# Patient Record
Sex: MALE | Employment: FULL TIME | URBAN - METROPOLITAN AREA
[De-identification: names, ages, dates, MRNs, and addresses within clinical notes are randomized per-mention and may not be internally consistent; named-entity substitution may affect disease eponyms.]

---

## 2021-11-29 PROCEDURE — 99283 EMERGENCY DEPT VISIT LOW MDM: CPT

## 2021-11-29 PROCEDURE — 12002 RPR S/N/AX/GEN/TRNK2.6-7.5CM: CPT

## 2021-11-30 ENCOUNTER — HOSPITAL ENCOUNTER (EMERGENCY)
Facility: CLINIC | Age: 56
Discharge: HOME | End: 2021-11-30
Attending: EMERGENCY MEDICINE | Admitting: EMERGENCY MEDICINE
Payer: COMMERCIAL

## 2021-11-30 ENCOUNTER — PATIENT OUTREACH (OUTPATIENT)
Dept: FAMILY MEDICINE | Facility: CLINIC | Age: 56
End: 2021-11-30
Payer: COMMERCIAL

## 2021-11-30 VITALS
OXYGEN SATURATION: 95 % | DIASTOLIC BLOOD PRESSURE: 85 MMHG | TEMPERATURE: 97.9 F | RESPIRATION RATE: 18 BRPM | SYSTOLIC BLOOD PRESSURE: 133 MMHG | HEART RATE: 104 BPM

## 2021-11-30 DIAGNOSIS — S61.412A LACERATION WITHOUT FOREIGN BODY OF LEFT HAND, INITIAL ENCOUNTER: ICD-10-CM

## 2021-11-30 ASSESSMENT — ENCOUNTER SYMPTOMS: WOUND: 1

## 2021-11-30 NOTE — ED TRIAGE NOTES
Pt was using a  and injured his left hand. Bleeding controled. Thinks he had a tetnus shot 2 years ago. Pt took 800mg of ibuprofen 45 min ago

## 2021-11-30 NOTE — TELEPHONE ENCOUNTER
Follow up with Chris Vera MD in 1 week (around  12/7/2021)    Why: For suture removal  Specialty: Internal Medicine  Contact: [address] [place] [address]  [place] [address]  [phone]

## 2021-11-30 NOTE — ED PROVIDER NOTES
History   Chief Complaint:  Laceration      The history is provided by the patient.      Ananda Diaz is a 56 year old male who presents for evaluation a left hand laceration. This wound occurred at home while he was working with a  approximately 45 minutes ago. He is not taking any blood thinning medication. His last tetanus shot was 2 years ago.    Review of Systems   Skin: Positive for wound.   All other systems reviewed and are negative.        Allergies:  No known allergies    Medications:  The patient is currently on no regular medications.    Past Medical History:     Right inguinal hernia  Chronic hip pain  Chronic right shoulder pain    Past Surgical History:    Nose surgery    Family History:    Breast cancer  Type 2 diabetes    Social History:  Presents with his wife  PCP: Chris Vera     Physical Exam     Patient Vitals for the past 24 hrs:   BP Temp Temp src Pulse Resp SpO2   11/30/21 0220 133/85 -- -- -- 18 95 %   11/29/21 2138 (!) 179/82 97.9  F (36.6  C) Oral 104 20 96 %       Physical Exam  General: Patient is alert and normal appearing.  HEENT: Head atraumatic   Chest: Heart regular rate and rhythm.   Lungs: Equal clear to auscultation with no wheeze or rales  Abdomen: Soft, non tender, nondistended,   Back: No costovertebral angle tenderness  Neuro: Grossly nonfocal, normal speech, strength equal bilaterally  Extremities: No deformities, equal radial and DP pulses. No clubbing, cyanosis.  No edema  Skin: Warm and dry with no rash.  5 cm laceration to the thenar eminence of his left hand.  Left thumb with cap refill less than 2 seconds.  Extensor and flexor tendon strength intact.  No foreign body identified.  Bleeding controlled.  Light touch sensation intact to the radial, median, ulnar nerve distribution      Emergency Department Course     Procedures    Laceration Repair        LACERATION:  A simple clean 5 cm laceration.      LOCATION:  Pad of left thumb      FUNCTION:   Distally sensation, circulation, motor and tendon function are intact.      ANESTHESIA:  Local using  1% Lidocaine w/ Epi total of 3 cc's      PREPARATION:  Irrigation with Normal Saline      DEBRIDEMENT:  no debridement      CLOSURE:  Wound was closed with One Layer.  Skin closed with 6 x 5.0 Ethylon using interrupted sutures.      Emergency Department Course:  Reviewed:  I reviewed nursing notes, vitals, past medical history and Care Everywhere    Assessments:  0142 I obtained history and examined the patient as noted above.   0155    I performed a laceration procedure, see procedure note for details    Disposition:  The patient was discharged to home.     Impression & Plan       Medical Decision Making:  Ananda Diaz is a 56 year old male who presents for evaluation of a laceration to the left hand thenar eminence.  The wound was carefully evaluated and explored.  The laceration was closed with sutures as noted above.  There is no evidence of muscular, tendon, or bony damage with this laceration.  No signs of foreign body.  Possible complications (infection, scarring) were reviewed with the patient.  Follow up with primary care as noted in the discharge section.      Diagnosis:    ICD-10-CM    1. Laceration without foreign body of left hand, initial encounter  S61.412A        Scribe Disclosure:  I, [place] Peck (trainee) and Jacqui Grider (), am serving as a scribe at 1:42 AM on 11/30/2021 to document services personally performed by Lisandra Torres MD based on my observations and the provider's statements to me.        Lisandra Torres MD  11/30/21 0456

## 2021-12-01 NOTE — TELEPHONE ENCOUNTER
"ED/Discharge Protocol    \"Hi, my name is Dejah Plata RN, a registered nurse, and I am calling on behalf of Dr. Vera's office at [place].  I am calling to follow up and see how things are going for you after your recent visit.\"    \"I see that you were in the (ER/UC/IP) on 11/30/21    How are you doing now that you are home?\" Spoke with pt. States he is doing very good. No pain. No new medications were prescribed.       Is patient experiencing symptoms that may require a hospital visit?  No    Discharge Instructions    \"Let's review your discharge instructions.  What is/are the follow-up recommendations?  Pt. Response: Rest, don't use left hand. Wash and dry hand. Take Advil for pain.    \"Were you instructed to make a follow-up appointment?\"  Pt. Response: Yes.  Has appointment been made?   No.  \"Can I help you schedule that appointment?\" Pt states his wife is going to call to schedule this for him.      \"When you see the provider, I would recommend that you bring your discharge instructions with you.    Medications    \"How many new medications are you on since your hospitalization/ED visit?\"    0-1  \"How many of your current medicines changed (dose, timing, name, etc.) while you were in the hospital/ED visit?\"   0-1  \"Do you have questions about your medications?\"   No  \"Were you newly diagnosed with heart failure, COPD, diabetes or did you have a heart attack?\"   No  For patients on insulin: \"Did you start on insulin in the hospital or did you have your insulin dose changed?\"   No  Post Discharge Medication Reconciliation Status: discharge medications reconciled, continue medications without change.    Was MTM referral placed (*Make sure to put transitions as reason for referral)?   No    Call Summary    \"Do you have any questions or concerns about your condition or care plan at the moment?\"    No  Triage nurse advice given: NA    Patient was in ER 1 in the past year (assess appropriateness of ER visits.)  " "    \"If you have questions or things don't continue to improve, we encourage you contact us through the main clinic number,  [phone].  Even if the clinic is not open, triage nurses are available 24/7 to help you.     We would like you to know that our clinic has extended hours (provide information).  We also have urgent care (provide details on closest location and hours/contact info)\"      \"Thank you for your time and take care!\"      "